# Patient Record
Sex: FEMALE | Race: OTHER
[De-identification: names, ages, dates, MRNs, and addresses within clinical notes are randomized per-mention and may not be internally consistent; named-entity substitution may affect disease eponyms.]

---

## 2018-02-13 ENCOUNTER — HOSPITAL ENCOUNTER (EMERGENCY)
Dept: HOSPITAL 80 - FED | Age: 83
Discharge: HOME | End: 2018-02-13
Payer: COMMERCIAL

## 2018-02-13 VITALS
RESPIRATION RATE: 18 BRPM | HEART RATE: 81 BPM | TEMPERATURE: 97.9 F | SYSTOLIC BLOOD PRESSURE: 187 MMHG | OXYGEN SATURATION: 96 % | DIASTOLIC BLOOD PRESSURE: 76 MMHG

## 2018-02-13 DIAGNOSIS — I10: ICD-10-CM

## 2018-02-13 DIAGNOSIS — Z79.82: ICD-10-CM
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
## 2018-02-13 DIAGNOSIS — H72.91: Primary | ICD-10-CM

## 2018-02-13 NOTE — EDPHY
H & P


HPI/ROS: 





HPI





CHIEF COMPLAINT:  Right ear pain, bleeding





HISTORY OF PRESENT ILLNESS:  This patient very pleasant 82-year-old female, she 

presents emergency room after she states that she accidentally stuck the tip of 

her glasses into her right ear canal.  She immediately had pain. This happened 

earlier today.  States initially did not bleed but however this evening it 

started bleeding.  She does have some mild pain to her right ear canal.  Denies 

any other areas of injury. She states this was an accident.  No ringing in her 

ears.  No other areas of trauma.





Past Medical History:  Osteoarthritis





Past Surgical History:  Hip surgery





Social History:  Denies drugs alcohol tobacco products





Family History:








ROS   


REVIEW OF SYSTEMS:


A comprehensive 10 point review of systems is otherwise negative aside from 

elements mentioned in the history of present illness.








Exam   


Constitutional   triage nursing summary reviewed, vital signs reviewed, awake/

alert. 


Eyes   normal conjunctivae and sclera, EOMI, PERRLA. 


HENT  left TM canal clear.  Right TM full of blood.  Cannot visualize TM.  Most 

likely has a ruptured TM.  Perforated.  normal inspection, atraumatic, moist 

mucus membranes, no epistaxis, neck supple/ no meningismus, no raccoon eyes. 


Respiratory   clear to auscultation bilaterally, normal breath sounds, no 

respiratory distress, no wheezing. 


Cardiovascular   rate normal, regular rhythm, no murmur, no edema, distal 

pulses normal. 


Gastrointestinal   soft, non-tender, no rebound, no guarding, normal bowel 

sounds, no distension, no pulsatile mass. 


Genitourinary   no CVA tenderness. 


Musculoskeletal  no midline vertebral tenderness, full range of motion, no calf 

swelling, no tenderness of extremities, no meningismus, good pulses, 

neurovascularly intact.


Skin   pink, warm, & dry, no rash, skin atraumatic. 


Neurologic   awake, alert and oriented x 3, AAOx3, moves all 4 extremities 

equally, motor intact, sensory intact, CN II-XII intact, normal cerebellar, 

normal vision, normal speech. 


Psychiatric   normal mood/affect. 


Heme/Lymph/Immune   no lymphadenopathy.





Differential Diagnosis:  Includes but is not limited to in a particular order 

ear canal trauma, TM rupture, trauma to the TM.  Perforated TM.





Medical Decision Making:  Plan for this patient she does have extensive 

allergies.  I will placed on Keflex and Norco for pain control.  I will refer 

her to ENT.  Should call there in the morning for follow-up care.  Additionally 

return emergency room if there is any worsening symptoms questions or concerns.














Source: Patient





- Medical/Surgical History


Hx Asthma: No


Hx Chronic Respiratory Disease: No


Hx Diabetes: No


Hx Cardiac Disease: No


Hx Renal Disease: No


Hx Cirrhosis: No


Hx Alcoholism: No


Hx HIV/AIDS: No


Hx Splenectomy or Spleen Trauma: No


Other PMH: HTN, gout and osteoarthritis, AMILCAR, rtha,





- Social History


Smoking Status: Never smoked


Constitutional: 


 Initial Vital Signs











Temperature (C)  36.6 C   02/13/18 00:42


 


Heart Rate  81   02/13/18 00:42


 


Respiratory Rate  18   02/13/18 00:42


 


Blood Pressure  187/76 H  02/13/18 00:42


 


O2 Sat (%)  96   02/13/18 00:42








 











O2 Delivery Mode               Room Air














Allergies/Adverse Reactions: 


 





celecoxib [From Celebrex] Allergy (Intermediate, Verified 01/06/16 14:20)


 Rash


lisinopril Allergy (Intermediate, Verified 09/30/16 08:07)


 COUGH


Penicillins Allergy (Verified 01/06/16 10:50)


 Rash


Sulfa (Sulfonamide Antibiotics) Allergy (Verified 01/06/16 10:50)


 Rash








Home Medications: 














 Medication  Instructions  Recorded


 


Cholecalciferol Vit D3 [Vitamin D3 2,000 units PO DAILY #0 cap 01/20/16





2000 units]  


 


Diazepam [Valium 5 MG (*)] 5 mg PO Q6 PRN #60 tab 01/20/16


 


Hydrochlorothiazide [HCTZ (*)] 12.5 mg PO HS #0 cap 01/20/16


 


Irbesartan [Avapro 150 mg (*)] 300 mg PO HS #0 tab 01/20/16


 


Nebivolol HCl [Bystolic 5 mg (*)] 20 mg PO HS #0 tab 01/20/16


 


Temazepam [Restoril 15 MG (*)] 15 mg PO HS PRN #0 cap 01/20/16


 


amLODIPine BESYLATE [Norvasc 5 mg 5 mg PO HS #0 tab 01/20/16





(*)]  


 


Acetaminophen [Tylenol 325mg (*)] 350 mg PO Q4-6PRN PRN 10/03/16


 


Allopurinol [Allopurinol 300  mg PO DAILY 10/03/16





(RX)]  


 


Bristaflam 100mg 1 each PO DAILY PRN 10/03/16


 


Simvastatin [Zocor] 20 mg PO HS 10/03/16


 


Aspirin [Aspirin 325 mg (*)] 325 mg PO DAILY #0 tab 10/05/16


 


traMADol [Ultram 50 mg (*)] 50 - 100 mg PO Q6H PRN #80 tab 10/05/16


 


Cephalexin [Keflex (*)] 500 mg PO Q6H #28 cap 02/13/18


 


Hydrocodone/APAP 5/325 [Norco 1 - 2 tab PO Q6H PRN #20 tab 02/13/18





5/325 (*)]  














Departure





- Departure


Disposition: Home, Routine, Self-Care


Clinical Impression: 


Ruptured eardrum


Qualifiers:


 Laterality: right Qualified Code(s): H72.91 - Unspecified perforation of 

tympanic membrane, right ear





Condition: Good


Instructions:  Ruptured Eardrum (ED)


Referrals: 


Shante Lindsey MD [Primary Care Provider] - As per Instructions


Lico Jacob MD [Medical Doctor] - As per Instructions


Prescriptions: 


Cephalexin [Keflex (*)] 500 mg PO Q6H #28 cap


Hydrocodone/APAP 5/325 [Norco 5/325 (*)] 1 - 2 tab PO Q6H PRN #20 tab


 PRN Reason: Pain, Mild